# Patient Record
Sex: MALE | Race: WHITE | Employment: OTHER | URBAN - METROPOLITAN AREA
[De-identification: names, ages, dates, MRNs, and addresses within clinical notes are randomized per-mention and may not be internally consistent; named-entity substitution may affect disease eponyms.]

---

## 2018-11-03 ENCOUNTER — HOSPITAL ENCOUNTER (EMERGENCY)
Age: 23
Discharge: HOME | End: 2018-11-03
Attending: EMERGENCY MEDICINE | Admitting: EMERGENCY MEDICINE
Payer: OTHER GOVERNMENT

## 2018-11-03 VITALS
DIASTOLIC BLOOD PRESSURE: 90 MMHG | SYSTOLIC BLOOD PRESSURE: 139 MMHG | TEMPERATURE: 98.9 F | HEART RATE: 69 BPM | OXYGEN SATURATION: 100 % | RESPIRATION RATE: 20 BRPM

## 2018-11-03 DIAGNOSIS — K12.1 STOMATITIS: Primary | ICD-10-CM

## 2018-11-03 PROCEDURE — 99282 EMERGENCY DEPT VISIT SF MDM: CPT

## 2018-11-03 RX ORDER — HYDROCODONE BITARTRATE AND ACETAMINOPHEN 5; 325 MG/1; MG/1
1 TABLET ORAL
COMMUNITY

## 2018-11-03 NOTE — DISCHARGE INSTRUCTIONS
Stomatitis: Care Instructions  Your Care Instructions  Stomatitis is swelling and redness of the lining of your mouth. It can cause painful sores that can make it hard for you to eat, drink, or swallow. Stomatitis may be caused by a viral or bacterial infection, a disease, or not taking care of your teeth and gums properly. Other causes include reactions to smoking, burns from hot food or drinks, or an allergic reaction. Allergy causes may be a result of flavorings such as spearmint or cinnamon that are used in chewing gum, toothpaste, soft drinks, candies, and other products. Your doctor may prescribe pain medicines or special mouth rinses. He or she may tell you to quit using some products that may be causing the sores. It can take up to 2 weeks for the sores to heal.  Some people with stomatitis also get a yeast infection of the mouth, called thrush. Medicines can treat this problem. Follow-up care is a key part of your treatment and safety. Be sure to make and go to all appointments, and call your doctor if you are having problems. It's also a good idea to know your test results and keep a list of the medicines you take. How can you care for yourself at home? · Be safe with medicines. Read and follow all instructions on the label. ? If the doctor gave you a prescription medicine for pain, take it as prescribed. ? If you are not taking a prescription pain medicine, ask your doctor if you can take an over-the-counter medicine. · If your doctor prescribed antibiotics, take them as directed. Do not stop taking them just because you feel better. You need to take the full course of antibiotics. · Use prescription mouth rinses as prescribed. Ask your doctor if you can freeze the mouth rinse in an ice cube tray. Sucking on a frozen cube of the mouth rinse can help ease the pain. · Make a rinse to keep your mouth from getting dry. Add 1 teaspoon baking soda and ½ teaspoon salt to a quart of water.  Use it to rinse your mouth 4 to 6 times each day. Spit out the rinse. Do not swallow it. · Do not use a mouthwash or other over-the-counter rinse with alcohol. These can dry out your mouth or cause more pain. · If your doctor gave you mouthwash or lozenges to treat your yeast infection, use them as directed. · Eat soft foods such as mashed potatoes and other cooked vegetables, noodles, applesauce, clear broth soups, yogurt, and cottage cheese. You can get extra protein by adding protein powder to milk shakes or breakfast drinks. Avoid eating spicy or crunchy foods. · Try eating cold foods such as ice cream or yogurt. · Avoid drinking high-acid juices such as orange, grapefruit, and cranberry juices. · Drink plenty of fluids to prevent dehydration. Drinking through a straw may help with pain. · Practice good oral hygiene. Use a very soft toothbrush to brush your teeth at least two times a day. Or use your finger to brush your teeth if your mouth is sore. When should you call for help? Call your doctor now or seek immediate medical care if:    · You have new or worse symptoms of infection, such as:  ? Increased pain, swelling, warmth, or redness. ? Red streaks leading from the area. ? Pus draining from the area. ? A fever.    Watch closely for changes in your health, and be sure to contact your doctor if:    · You do not get better as expected. Where can you learn more? Go to http://rayray-kyle.info/. Enter R821 in the search box to learn more about \"Stomatitis: Care Instructions. \"  Current as of: March 28, 2018  Content Version: 11.8  © 6727-7731 [employer]. Care instructions adapted under license by [employer] (which disclaims liability or warranty for this information).  If you have questions about a medical condition or this instruction, always ask your healthcare professional. [address] any warranty or liability for your use of this information.

## 2018-11-03 NOTE — ED NOTES
Current Discharge Medication List  
  
START taking these medications Details  
magic mouthwash (MATT) susp Take 5 mL by mouth every four (4) hours as needed. Qty: 120 mL, Refills: 0 Comments: Equal parts Maalox [phone] , Benadryl  25 mg and [id number] Patient armband removed and shredded. Prescription given and reviewed with patient.

## 2018-11-03 NOTE — ED PROVIDER NOTES
EMERGENCY DEPARTMENT HISTORY AND PHYSICAL EXAM 
 
Date: 11/3/2018 Patient Name: Luis Lao History of Presenting Illness No chief complaint on file. History Provided By: Patient Chief Complaint: mouth sores Duration: 5Days Timing:  Gradual 
Location: lips and inside of mouth Quality: Burning Severity: 8 out of 10 Modifying Factors: Hydrocodone, salt water rinse Associated Symptoms: denies any other associated signs or symptoms Additional History (Context): Luis Lao is a 23 y.o. male with No significant past medical history who presents with mouth sores after having 3 teeth pulled 5 days ago. Pain well managed with Hydrocodone but took the last one today. Will follow up with dentist, but needs pain control. PCP: None Past History Past Medical History: 
History reviewed. No pertinent past medical history. Past Surgical History: 
History reviewed. No pertinent surgical history. Family History: 
History reviewed. No pertinent family history. Social History: 
Social History Tobacco Use  Smoking status: Unknown If Ever Smoked  Smokeless tobacco: Never Used Substance Use Topics  Alcohol use: Not on file  Drug use: Not on file Allergies: Allergies Allergen Reactions  Penicillins Other (comments) Review of Systems Review of Systems Constitutional: Positive for appetite change. Negative for fever. HENT: Positive for dental problem and mouth sores. Negative for drooling. Respiratory: Negative for cough. Cardiovascular: Negative for chest pain. Genitourinary: Negative for dysuria. Musculoskeletal: Negative for neck pain. Skin: Negative for color change. Neurological: Negative for weakness and headaches. All Other Systems Negative Physical Exam  
 
Vitals:  
 11/03/18 1600 11/03/18 1601 BP: 139/90 139/90 Pulse: 70 69 Resp: 20 20 Temp: 98.9 °F (37.2 °C) 98.9 °F (37.2 °C) SpO2: 100% 100% Physical Exam  
Constitutional: He is oriented to person, place, and time. He appears well-developed and well-nourished. No distress. HENT:  
Head: Normocephalic and atraumatic. Right Ear: External ear normal.  
Left Ear: External ear normal.  
Mouth/Throat: Oropharynx is clear and moist. No oropharyngeal exudate. Aphthous ulceration noted on upper lip, soft palate and buccal mucosa. Airway is patent. No dry socket noted at extraction site. No gingival swelling. Handling all oral secretions well. Eyes: Conjunctivae are normal.  
Neck: Normal range of motion. Neck supple. Cardiovascular: Normal rate, regular rhythm and normal heart sounds. Pulmonary/Chest: Effort normal and breath sounds normal. He has no wheezes. Musculoskeletal: Normal range of motion. Lymphadenopathy:  
  He has no cervical adenopathy. Neurological: He is alert and oriented to person, place, and time. Skin: Skin is warm and dry. He is not diaphoretic. Psychiatric: He has a normal mood and affect. Diagnostic Study Results Labs - No results found for this or any previous visit (from the past 12 hour(s)). Radiologic Studies - No orders to display CT Results  (Last 48 hours) None CXR Results  (Last 48 hours) None Medical Decision Making I am the first provider for this patient. I reviewed the vital signs, available nursing notes, past medical history, past surgical history, family history and social history. Vital Signs-Reviewed the patient's vital signs. Pulse Oximetry Analysis - 100% on RA Records Reviewed: Nursing Notes and Old Medical Records Procedures: 
Procedures Provider Notes (Medical Decision Making): MED RECONCILIATION: 
No current facility-administered medications for this encounter. Current Outpatient Medications Medication Sig  
 HYDROcodone-acetaminophen (NORCO) 5-325 mg per tablet Take 1 Tab by mouth.  magic mouthwash (MATT) susp Take 5 mL by mouth every four (4) hours as needed. Disposition: 
 
 
DISCHARGE NOTE:  
 
Pt looks well. VSS. Exam overall unremarkable. Continue to take ABX and dental follow up Patient has no new complaints, changes, or physical findings. Care plan outlined and precautions discussed. . All medications were reviewed with the patient; will d/c home with magic mouthwash. NO narcitcs. All of pt's questions and concerns were addressed. Patient was instructed and agrees to follow up with his dentisst, as well as to return to the ED upon further deterioration. Patient is ready to go home. Follow-up Information Follow up With Specialties Details Why Contact Info Your dentist      
 [id number] [facility] EMERGENCY DEPT Emergency Medicine  if you develop facial swelling, difficulty swallowing, fever or other worsening symptoms Lukas Mayer [id number] [phone] Current Discharge Medication List  
  
START taking these medications Details  
magic mouthwash (MATT) susp Take 5 mL by mouth every four (4) hours as needed. Qty: 120 mL, Refills: 0 Comments: Equal parts Maalox [phone] , Benadryl  25 mg and [id number] Diagnosis Clinical Impression: 1. Stomatitis

## 2018-11-03 NOTE — ED TRIAGE NOTES
Pt states he had 3 teeth pulled on Tuesday. And now has sore in his mouth. States he went to [facility]. States toook his last hydrocodone and drove here.